# Patient Record
Sex: FEMALE | Race: WHITE | NOT HISPANIC OR LATINO | URBAN - METROPOLITAN AREA
[De-identification: names, ages, dates, MRNs, and addresses within clinical notes are randomized per-mention and may not be internally consistent; named-entity substitution may affect disease eponyms.]

---
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
## 2024-05-18 NOTE — PROCEDURE: ISOTRETINOIN MONITORING
Report given to Taiwo BLAIR on [address]    Counseling Text: I reviewed the side effect in detail. Patient should get monthly blood tests, not donate blood, not drive at night if vision affected, and not share medication.

## 2025-04-26 NOTE — PROCEDURE: ISOTRETINOIN MONITORING
ambulatory Myalgia Monitoring: I explained this is common when taking isotretinoin. If this worsens they will contact us.